# Patient Record
Sex: MALE | Race: WHITE | NOT HISPANIC OR LATINO
[De-identification: names, ages, dates, MRNs, and addresses within clinical notes are randomized per-mention and may not be internally consistent; named-entity substitution may affect disease eponyms.]

---

## 2019-07-31 PROBLEM — Z00.00 ENCOUNTER FOR PREVENTIVE HEALTH EXAMINATION: Status: ACTIVE | Noted: 2019-07-31

## 2019-08-05 ENCOUNTER — APPOINTMENT (OUTPATIENT)
Dept: PULMONOLOGY | Facility: CLINIC | Age: 64
End: 2019-08-05
Payer: COMMERCIAL

## 2019-08-05 ENCOUNTER — APPOINTMENT (OUTPATIENT)
Dept: NEPHROLOGY | Facility: CLINIC | Age: 64
End: 2019-08-05
Payer: COMMERCIAL

## 2019-08-05 VITALS
SYSTOLIC BLOOD PRESSURE: 140 MMHG | HEART RATE: 76 BPM | BODY MASS INDEX: 34.93 KG/M2 | DIASTOLIC BLOOD PRESSURE: 78 MMHG | OXYGEN SATURATION: 94 % | HEIGHT: 70 IN | WEIGHT: 244 LBS

## 2019-08-05 VITALS
HEIGHT: 70 IN | HEART RATE: 64 BPM | DIASTOLIC BLOOD PRESSURE: 72 MMHG | SYSTOLIC BLOOD PRESSURE: 164 MMHG | BODY MASS INDEX: 34.93 KG/M2 | WEIGHT: 244 LBS

## 2019-08-05 LAB — EPWORTH SCORE: 8

## 2019-08-05 PROCEDURE — 99214 OFFICE O/P EST MOD 30 MIN: CPT

## 2019-08-05 PROCEDURE — 99204 OFFICE O/P NEW MOD 45 MIN: CPT

## 2019-08-05 NOTE — REVIEW OF SYSTEMS
[Fever] : no fever [Chills] : no chills [Fatigue] : no fatigue [Poor Appetite] : normal appetite  [Recent Wt Gain (___ Lbs)] : recent [unfilled] ~Ulb weight gain [Dry Eyes] : no dryness of the eyes [Eye Irritation] : no ~T irritation of the eyes [Nasal Congestion] : no nasal congestion [Cough] : no cough [Sputum] : not coughing up ~M sputum [Hemoptysis] : no hemoptysis [Dyspnea] : no dyspnea [Hypertension] : ~T hypertension [Chest Discomfort] : no chest discomfort [Orthopnea] : no orthopnea [Edema] : ~T edema was not present [Hay Fever] : no hay fever [Nasal Discharge] : no nasal discharge [Itchy Eyes] : no itching of ~T the eyes [Heartburn] : no heartburn [Reflux] : no reflux [Nausea] : no nausea [Vomiting] : no vomiting [Nocturia] : no nocturia [Arthralgias] : no arthralgias [Headache] : no headache [Rash] : no [unfilled] rash [Dizziness] : no dizziness

## 2019-08-05 NOTE — PHYSICAL EXAM
[General Appearance - Well Developed] : well developed [General Appearance - In No Acute Distress] : no acute distress [General Appearance - Well Nourished] : well nourished [III] : III [Thyroid Diffuse Enlargement] : the thyroid was not enlarged [Jugular Venous Distention Increased] : there was no jugular-venous distention [Arterial Pulses Normal] : the arterial pulses were normal [Heart Sounds] : normal S1 and S2 [Exaggerated Use Of Accessory Muscles For Inspiration] : no accessory muscle use [Respiration, Rhythm And Depth] : normal respiratory rhythm and effort [Abdomen Soft] : soft [Auscultation Breath Sounds / Voice Sounds] : lungs were clear to auscultation bilaterally [Bowel Sounds] : normal bowel sounds [Nail Clubbing] : no clubbing of the fingernails [] : no hepato-splenomegaly [Cyanosis, Localized] : no localized cyanosis [Fingers Osler's Nodes] : Osler's nodes were not seenon the fingers [Skin Color & Pigmentation] : normal skin color and pigmentation [No Focal Deficits] : no focal deficits [Oriented To Time, Place, And Person] : oriented to person, place, and time [Impaired Insight] : insight and judgment were intact

## 2019-08-05 NOTE — ASSESSMENT
[FreeTextEntry1] : JAKUB\par Obesity\par \par This is a 64 year old  with a medical history including HTN presenting with  JAKUB. Today we reviewed the pathophysiology of this condition including the increased risk of neurocognitive, metabolic and cardiovascular complications. I explained that dilating muscles that normally maintain the airway open during wakefulness are less active in sleep and that the diameter of the upper airway normally decreases in sleep. I demonstrated how an additional factor such as overweight or a disadvantageous airway anatomy may cause the airway to collapse. Furthermore I explained that airway compromise is typically followed by an arousal which the patient may or may not be aware of

## 2019-08-05 NOTE — HISTORY OF PRESENT ILLNESS
[Obstructive Sleep Apnea] : obstructive sleep apnea [FreeTextEntry1] : 64 year old male with JAKUB, diagnosed last year, currently on PAP therapy, for evaluation.\par Denies other lung problems\par Thinks he sleeps better with PAP.\par Does not know if he snores.\par Recently d/c from the hospital.\par \par BT 10 pm\par SLT 15 min\par WT 6 am

## 2019-08-06 ENCOUNTER — RECORD ABSTRACTING (OUTPATIENT)
Age: 64
End: 2019-08-06

## 2019-08-06 DIAGNOSIS — Z87.898 PERSONAL HISTORY OF OTHER SPECIFIED CONDITIONS: ICD-10-CM

## 2019-08-06 DIAGNOSIS — Z87.19 PERSONAL HISTORY OF OTHER DISEASES OF THE DIGESTIVE SYSTEM: ICD-10-CM

## 2019-08-06 DIAGNOSIS — R60.0 LOCALIZED EDEMA: ICD-10-CM

## 2019-08-07 LAB
ALDOSTERONE SERUM: 41.5 NG/DL
ANION GAP SERPL CALC-SCNC: 19 MMOL/L
BUN SERPL-MCNC: 19 MG/DL
CALCIUM SERPL-MCNC: 10.7 MG/DL
CHLORIDE SERPL-SCNC: 99 MMOL/L
CO2 SERPL-SCNC: 24 MMOL/L
CREAT SERPL-MCNC: 1.17 MG/DL
GLUCOSE SERPL-MCNC: 103 MG/DL
POTASSIUM SERPL-SCNC: 5.3 MMOL/L
SODIUM SERPL-SCNC: 142 MMOL/L

## 2019-08-07 NOTE — HISTORY OF PRESENT ILLNESS
[FreeTextEntry1] : f/u for hospitalization following admission for hypertensive urgency -  possible hyperaldo, BP elevated but improving; using CPAP, tolerating celexa - dose increased to 20mg, counseled on weightlos, exercise, avoiadnce of NSAIDs, recheck bmet, renin/bartolo

## 2019-08-07 NOTE — PHYSICAL EXAM
[General Appearance - In No Acute Distress] : in no acute distress [General Appearance - Alert] : alert [Sclera] : the sclera and conjunctiva were normal [Extraocular Movements] : extraocular movements were intact [Outer Ear] : the ears and nose were normal in appearance [Hearing Threshold Finger Rub Not [place]] : hearing was normal [Neck Appearance] : the appearance of the neck was normal [Apical Impulse] : the apical impulse was normal [Exaggerated Use Of Accessory Muscles For Inspiration] : no accessory muscle use [Heart Sounds] : normal S1 and S2 [No CVA Tenderness] : no ~M costovertebral angle tenderness [Abnormal Walk] : normal gait [Musculoskeletal - Swelling] : no joint swelling seen [Skin Turgor] : normal skin turgor [Skin Color & Pigmentation] : normal skin color and pigmentation [] : no rash [Impaired Insight] : insight and judgment were intact [Oriented To Time, Place, And Person] : oriented to person, place, and time

## 2019-08-20 ENCOUNTER — RX RENEWAL (OUTPATIENT)
Age: 64
End: 2019-08-20

## 2019-08-20 LAB — RENIN ACTIVITY, PLASMA: 1.85 NG/ML/HR

## 2019-08-26 ENCOUNTER — APPOINTMENT (OUTPATIENT)
Dept: NEPHROLOGY | Facility: CLINIC | Age: 64
End: 2019-08-26
Payer: COMMERCIAL

## 2019-08-26 VITALS — HEART RATE: 60 BPM | SYSTOLIC BLOOD PRESSURE: 148 MMHG | DIASTOLIC BLOOD PRESSURE: 66 MMHG

## 2019-08-26 DIAGNOSIS — F41.9 ANXIETY DISORDER, UNSPECIFIED: ICD-10-CM

## 2019-08-26 PROCEDURE — 93784 AMBL BP MNTR W/SOFTWARE: CPT

## 2019-08-26 PROCEDURE — 36415 COLL VENOUS BLD VENIPUNCTURE: CPT

## 2019-08-27 LAB
ANION GAP SERPL CALC-SCNC: 14 MMOL/L
BUN SERPL-MCNC: 15 MG/DL
CALCIUM SERPL-MCNC: 10.2 MG/DL
CHLORIDE SERPL-SCNC: 99 MMOL/L
CO2 SERPL-SCNC: 26 MMOL/L
CREAT SERPL-MCNC: 1.26 MG/DL
GLUCOSE SERPL-MCNC: 137 MG/DL
POTASSIUM SERPL-SCNC: 4.9 MMOL/L
SODIUM SERPL-SCNC: 139 MMOL/L

## 2019-08-29 ENCOUNTER — OTHER (OUTPATIENT)
Age: 64
End: 2019-08-29

## 2019-09-18 ENCOUNTER — RESULT REVIEW (OUTPATIENT)
Age: 64
End: 2019-09-18

## 2019-09-18 ENCOUNTER — APPOINTMENT (OUTPATIENT)
Dept: NEPHROLOGY | Facility: CLINIC | Age: 64
End: 2019-09-18
Payer: COMMERCIAL

## 2019-09-18 ENCOUNTER — APPOINTMENT (OUTPATIENT)
Dept: PULMONOLOGY | Facility: CLINIC | Age: 64
End: 2019-09-18
Payer: COMMERCIAL

## 2019-09-18 VITALS
SYSTOLIC BLOOD PRESSURE: 140 MMHG | HEART RATE: 71 BPM | HEIGHT: 70 IN | BODY MASS INDEX: 35 KG/M2 | WEIGHT: 244.5 LBS | DIASTOLIC BLOOD PRESSURE: 88 MMHG

## 2019-09-18 VITALS
SYSTOLIC BLOOD PRESSURE: 140 MMHG | OXYGEN SATURATION: 98 % | HEART RATE: 71 BPM | BODY MASS INDEX: 34.93 KG/M2 | DIASTOLIC BLOOD PRESSURE: 88 MMHG | WEIGHT: 244 LBS | HEIGHT: 70 IN

## 2019-09-18 DIAGNOSIS — Z87.39 PERSONAL HISTORY OF OTHER DISEASES OF THE MUSCULOSKELETAL SYSTEM AND CONNECTIVE TISSUE: ICD-10-CM

## 2019-09-18 PROCEDURE — 99213 OFFICE O/P EST LOW 20 MIN: CPT

## 2019-09-18 PROCEDURE — 99214 OFFICE O/P EST MOD 30 MIN: CPT

## 2019-09-18 RX ORDER — SPIRONOLACTONE 50 MG/1
50 TABLET ORAL TWICE DAILY
Qty: 180 | Refills: 1 | Status: DISCONTINUED | COMMUNITY
Start: 2019-08-20 | End: 2019-09-18

## 2019-09-18 RX ORDER — CITALOPRAM HYDROBROMIDE 10 MG/1
10 TABLET, FILM COATED ORAL DAILY
Qty: 90 | Refills: 0 | Status: DISCONTINUED | COMMUNITY
Start: 1900-01-01 | End: 2019-09-18

## 2019-09-18 NOTE — PHYSICAL EXAM
[General Appearance - Alert] : alert [General Appearance - In No Acute Distress] : in no acute distress [Sclera] : the sclera and conjunctiva were normal [Extraocular Movements] : extraocular movements were intact [Outer Ear] : the ears and nose were normal in appearance [Neck Appearance] : the appearance of the neck was normal [Hearing Threshold Finger Rub Not [place]] : hearing was normal [Exaggerated Use Of Accessory Muscles For Inspiration] : no accessory muscle use [Apical Impulse] : the apical impulse was normal [No CVA Tenderness] : no ~M costovertebral angle tenderness [Heart Sounds] : normal S1 and S2 [Musculoskeletal - Swelling] : no joint swelling seen [Abnormal Walk] : normal gait [Skin Turgor] : normal skin turgor [Skin Color & Pigmentation] : normal skin color and pigmentation [Oriented To Time, Place, And Person] : oriented to person, place, and time [] : no rash [Impaired Insight] : insight and judgment were intact

## 2019-09-19 NOTE — ASSESSMENT
[FreeTextEntry1] : f/u for hospitalization following admission for hypertensive urgency -  possible hyperaldo, BP elevated but improving; using CPAP, diff losing weight, given loss of body hair and chushingoid habitus will check testosterone, prolactin and cortisol level. Though he has not developed gynecomastia, yadiel change spironolactone to eplerenone, reduce labetolol  - advised he monitor his bp. As this visit was mid afternoon, cortisol level was not checked. Will have him return  for early am cortisol level, acth. May need referral to Endocrine.

## 2019-09-19 NOTE — HISTORY OF PRESENT ILLNESS
[FreeTextEntry1] : here for f/u for hospitalization following admission for hypertensive urgency -  possible hyperaldo, BP elevated but improving; using CPAP, counseled on weightloss, exercise, avoiadnce of NSAIDs, is continuing to have diff losing weight - acknowledgeshe has lost body hair ( arms, face, chest), denies breast tenderness

## 2019-09-24 ENCOUNTER — MEDICATION RENEWAL (OUTPATIENT)
Age: 64
End: 2019-09-24

## 2019-09-24 ENCOUNTER — APPOINTMENT (OUTPATIENT)
Dept: NEPHROLOGY | Facility: CLINIC | Age: 64
End: 2019-09-24
Payer: COMMERCIAL

## 2019-09-24 VITALS — HEART RATE: 60 BPM | SYSTOLIC BLOOD PRESSURE: 132 MMHG | DIASTOLIC BLOOD PRESSURE: 62 MMHG

## 2019-09-24 PROCEDURE — 36415 COLL VENOUS BLD VENIPUNCTURE: CPT

## 2019-09-24 PROCEDURE — 93784 AMBL BP MNTR W/SOFTWARE: CPT

## 2019-09-24 RX ORDER — EPLERENONE 50 MG/1
50 TABLET, COATED ORAL DAILY
Qty: 90 | Refills: 1 | Status: DISCONTINUED | COMMUNITY
Start: 2019-09-18 | End: 2019-09-24

## 2019-09-25 LAB
ALDOSTERONE SERUM: 67 NG/DL
ANION GAP SERPL CALC-SCNC: 13 MMOL/L
BUN SERPL-MCNC: 21 MG/DL
CALCIUM SERPL-MCNC: 10 MG/DL
CHLORIDE SERPL-SCNC: 104 MMOL/L
CO2 SERPL-SCNC: 24 MMOL/L
CORTIS SERPL-MCNC: 9.6 UG/DL
CREAT SERPL-MCNC: 1.38 MG/DL
GLUCOSE SERPL-MCNC: 135 MG/DL
POTASSIUM SERPL-SCNC: 4.9 MMOL/L
SODIUM SERPL-SCNC: 141 MMOL/L

## 2019-10-01 ENCOUNTER — APPOINTMENT (OUTPATIENT)
Dept: NEPHROLOGY | Facility: CLINIC | Age: 64
End: 2019-10-01
Payer: COMMERCIAL

## 2019-10-01 VITALS — SYSTOLIC BLOOD PRESSURE: 176 MMHG | HEART RATE: 80 BPM | DIASTOLIC BLOOD PRESSURE: 84 MMHG

## 2019-10-01 LAB — RENIN ACTIVITY, PLASMA: 0.75 NG/ML/HR

## 2019-10-01 PROCEDURE — 93784 AMBL BP MNTR W/SOFTWARE: CPT

## 2019-10-03 ENCOUNTER — MEDICATION RENEWAL (OUTPATIENT)
Age: 64
End: 2019-10-03

## 2019-10-08 ENCOUNTER — RESULT REVIEW (OUTPATIENT)
Age: 64
End: 2019-10-08

## 2019-10-17 ENCOUNTER — RESULT REVIEW (OUTPATIENT)
Age: 64
End: 2019-10-17

## 2019-10-30 ENCOUNTER — RESULT REVIEW (OUTPATIENT)
Age: 64
End: 2019-10-30

## 2019-10-30 ENCOUNTER — APPOINTMENT (OUTPATIENT)
Dept: NEPHROLOGY | Facility: CLINIC | Age: 64
End: 2019-10-30
Payer: COMMERCIAL

## 2019-10-30 VITALS
SYSTOLIC BLOOD PRESSURE: 148 MMHG | WEIGHT: 249 LBS | DIASTOLIC BLOOD PRESSURE: 70 MMHG | HEIGHT: 70 IN | BODY MASS INDEX: 35.65 KG/M2 | HEART RATE: 60 BPM

## 2019-10-30 PROCEDURE — 99214 OFFICE O/P EST MOD 30 MIN: CPT | Mod: 25

## 2019-10-30 PROCEDURE — 36415 COLL VENOUS BLD VENIPUNCTURE: CPT

## 2019-10-30 RX ORDER — NIFEDIPINE 30 MG/1
30 TABLET, EXTENDED RELEASE ORAL DAILY
Qty: 90 | Refills: 0 | Status: DISCONTINUED | COMMUNITY
Start: 1900-01-01 | End: 2019-10-30

## 2019-10-30 NOTE — PHYSICAL EXAM
[General Appearance - Alert] : alert [General Appearance - In No Acute Distress] : in no acute distress [Sclera] : the sclera and conjunctiva were normal [Extraocular Movements] : extraocular movements were intact [Outer Ear] : the ears and nose were normal in appearance [Hearing Threshold Finger Rub Not [place]] : hearing was normal [Neck Appearance] : the appearance of the neck was normal [Exaggerated Use Of Accessory Muscles For Inspiration] : no accessory muscle use [Apical Impulse] : the apical impulse was normal [Heart Sounds] : normal S1 and S2 [No CVA Tenderness] : no ~M costovertebral angle tenderness [Abnormal Walk] : normal gait [Musculoskeletal - Swelling] : no joint swelling seen [Skin Color & Pigmentation] : normal skin color and pigmentation [Skin Turgor] : normal skin turgor [] : no rash [Oriented To Time, Place, And Person] : oriented to person, place, and time [Impaired Insight] : insight and judgment were intact

## 2019-10-30 NOTE — ASSESSMENT
[FreeTextEntry1] : 63 yo Polish male here for f/u for hypertensive urgency -  possible hyperaldo, BP elevated but improving; using CPAP, counseled on weight loss, exercise, avoidance of NSAIDs, is continuing to have diff losing weight - tried to taper down dose of methylodpa and replace with nifedipine while continuing labetalol and eplerenone,states BP elevated early am prior to taking meds and late pm before taking meds - suspect 2/2 waning effect - will d/c nifedipine and resume methyldopa to 500mg bid, cont labetalol and eplerenone.\par \par CTA  showed no AAA or KAN; MRI of adrenals -ve for adenoma, \par \par Continues to gain weight - checked testosterone levels and total was low normal, free was below normal -suspect this may be source of weight gain , inability to lose weight and adding to difficulty controlling bp

## 2019-10-30 NOTE — HISTORY OF PRESENT ILLNESS
[FreeTextEntry1] : 65 yo [place] male here for f/u for hypertensive urgency -  possible hyperaldo, BP elevated but improving; using CPAP, counseled on weightloss, exercise, avoidance of NSAIDs, is continuing to have diff losing weight - tried to taper down dose of methylodpa and replace with nifedipine while continuing labetalol and eplerenone,states BP elevated early am prior to taking meds and late pm before taking meds - suspect 2/2 waning effect - will d/c nifedipine and resume methyldopa t 500mg bid, cont labetalol and eplerenone.\par \par CTA  showed no AAA or KAN; MRI of adrenals -ve for adenoma, \par \par Continues to gain weight - checked testosterone levels and total was low normal, free was below normal -suspect this may be source of weight gain , inability to lose weight and adding to difficulty controlling bp

## 2019-11-06 ENCOUNTER — APPOINTMENT (OUTPATIENT)
Dept: ENDOCRINOLOGY | Facility: CLINIC | Age: 64
End: 2019-11-06
Payer: COMMERCIAL

## 2019-11-06 VITALS
SYSTOLIC BLOOD PRESSURE: 142 MMHG | HEART RATE: 76 BPM | WEIGHT: 250 LBS | BODY MASS INDEX: 35.79 KG/M2 | DIASTOLIC BLOOD PRESSURE: 78 MMHG | HEIGHT: 70 IN

## 2019-11-06 DIAGNOSIS — E53.8 DEFICIENCY OF OTHER SPECIFIED B GROUP VITAMINS: ICD-10-CM

## 2019-11-06 DIAGNOSIS — D64.9 ANEMIA, UNSPECIFIED: ICD-10-CM

## 2019-11-06 PROCEDURE — 99203 OFFICE O/P NEW LOW 30 MIN: CPT

## 2019-11-06 NOTE — HISTORY OF PRESENT ILLNESS
[FreeTextEntry1] : Nov 06, 2019\par \par PCP:  Dr. Horace Barrera at [facility]  fax [phone]\par          Kidney:  Dr. Ziggy Elizabeth\par          Lung:  Dr. Toribio Delacruz\par \par 63 yo single, , ex-smoker with fatigue, hypertension, decreased GFR, prediabetes\par \par Brings labs kindly provided by Dr. Barrera from\par 10/3/2019 including\par creatinine 1.49\par glucose 117 \par HbA1c 6.1 %\par  (down from 500) \par TSH 2.4 \par \par Patient reports he was hospitalized for hyeprtension end of July.  \par Labs at [facility] from October 30, 2019 included\par  \par aldosterone 39 (< 23)\par renin 6.1\par testosterone 275 (264 - 916)\par cortisol 9.6\par \par September 2019\par Hct 39.5 ***\par creatinine 1.6\par prolactin 7.5\par testosterone 296 (264 - 916)\par calcium 10.7 ***\par \par MRI abdomen - normal adrenals\par \par Impression:  \par # Fatigue.  His evaluation included total testosterone within population normal limits on two occasions, as\par well as normal prolactin.   Even so, LH will be helpful in determining possible early primary hypogonadism.\par Labs also show that he appears to have developed a mild anemia.   [facility] records show\par essentially normal hematocrit going back to 2010.   He recalls a colonoscopy at office of Dr. Mondragon about\par 5 years ago. He will go for repeat CBC and also discuss with Dr. Barrera\par \par #  Hypertension.   Found to have normal renin, elevated aldosterone raising possibility of\par mineralocorticoid hypertension or possibly KAN.   Thorough evaluation includeing CT and MRI of\par abdomen revealed normal appearing adrenal glands.   BP has come under control with current Rx.  \par \par #  Prediabetes -  He will stop by [facility] for updated fasting labs within the next few days and I took the\par     liberty of asking him to return here in several weeks.   Thank you very much.  - \par \par \par

## 2019-11-06 NOTE — PHYSICAL EXAM
[Alert] : alert [No Acute Distress] : no acute distress [Well Nourished] : well nourished [Well Developed] : well developed [Healthy Appearance] : healthy appearance [Normal Voice/Communication] : normal voice communication [PERRL] : pupils equal, round and reactive to light [No Proptosis] : no proptosis [No Lid Lag] : no lid lag [No Respiratory Distress] : no respiratory distress [Normal Rate and Effort] : normal respiratory rhythm and effort [No Accessory Muscle Use] : no accessory muscle use [Normal Rate] : heart rate was normal  [Normal S1, S2] : normal S1 and S2 [Regular Rhythm] : with a regular rhythm [No Edema] : there was no peripheral edema [No Stigmata of Cushings Syndrome] : no stigmata of cushings syndrome [No Tremors] : no tremors [Oriented x3] : oriented to person, place, and time [Normal Insight/Judgement] : insight and judgment were intact [Normal Affect] : the affect was normal [Normal Mood] : the mood was normal [de-identified] : Thyroid a tad plump

## 2019-11-07 ENCOUNTER — INBOUND DOCUMENT (OUTPATIENT)
Age: 64
End: 2019-11-07

## 2019-11-08 LAB
ACTH SER-ACNC: 18.6 PG/ML
APPEARANCE: CLEAR
BACTERIA: NEGATIVE
BASOPHILS # BLD AUTO: 0.07 K/UL
BASOPHILS NFR BLD AUTO: 0.7 %
BILIRUBIN URINE: NEGATIVE
BLOOD URINE: NEGATIVE
COLOR: YELLOW
CORTIS SERPL-MCNC: 9.6 UG/DL
CREAT SPEC-SCNC: 147 MG/DL
EOSINOPHIL # BLD AUTO: 0.16 K/UL
EOSINOPHIL NFR BLD AUTO: 1.6 %
ESTRADIOL SERPL-MCNC: 29 PG/ML
FERRITIN SERPL-MCNC: 265 NG/ML
FRUCTOSAMINE SERPL-MCNC: 244 UMOL/L
FSH SERPL-MCNC: 3.8 IU/L
GLUCOSE QUALITATIVE U: NEGATIVE
HCT VFR BLD CALC: 42.9 %
HGB BLD-MCNC: 13.9 G/DL
HYALINE CASTS: 0 /LPF
IMM GRANULOCYTES NFR BLD AUTO: 0.3 %
IRON SATN MFR SERPL: 19 %
IRON SERPL-MCNC: 64 UG/DL
KETONES URINE: NEGATIVE
LEUKOCYTE ESTERASE URINE: NEGATIVE
LH SERPL-ACNC: 3.3 IU/L
LYMPHOCYTES # BLD AUTO: 1.65 K/UL
LYMPHOCYTES NFR BLD AUTO: 16.5 %
MAN DIFF?: NORMAL
MCHC RBC-ENTMCNC: 32 PG
MCHC RBC-ENTMCNC: 32.4 GM/DL
MCV RBC AUTO: 98.8 FL
MICROALBUMIN 24H UR DL<=1MG/L-MCNC: 8.2 MG/DL
MICROALBUMIN/CREAT 24H UR-RTO: 56 MG/G
MICROSCOPIC-UA: NORMAL
MONOCYTES # BLD AUTO: 0.85 K/UL
MONOCYTES NFR BLD AUTO: 8.5 %
NEUTROPHILS # BLD AUTO: 7.27 K/UL
NEUTROPHILS NFR BLD AUTO: 72.4 %
NITRITE URINE: NEGATIVE
PH URINE: 7
PHOSPHATE SERPL-MCNC: 3.3 MG/DL
PLATELET # BLD AUTO: 242 K/UL
PROTEIN URINE: NORMAL
RBC # BLD: 4.34 M/UL
RBC # BLD: 4.34 M/UL
RBC # FLD: 12.5 %
RED BLOOD CELLS URINE: 1 /HPF
RETICS # AUTO: 1.9 %
RETICS AGGREG/RBC NFR: 80.7 K/UL
SPECIFIC GRAVITY URINE: 1.02
SQUAMOUS EPITHELIAL CELLS: 0 /HPF
T3 SERPL-MCNC: 125 NG/DL
T4 SERPL-MCNC: 6.9 UG/DL
THYROGLOB AB SERPL-ACNC: <20 IU/ML
THYROPEROXIDASE AB SERPL IA-ACNC: 24 IU/ML
TIBC SERPL-MCNC: 334 UG/DL
TSH SERPL-ACNC: 1.93 UIU/ML
UIBC SERPL-MCNC: 270 UG/DL
UROBILINOGEN URINE: NORMAL
VIT B12 SERPL-MCNC: 391 PG/ML
WBC # FLD AUTO: 10.03 K/UL
WHITE BLOOD CELLS URINE: 0 /HPF

## 2019-11-14 ENCOUNTER — APPOINTMENT (OUTPATIENT)
Dept: NEPHROLOGY | Facility: CLINIC | Age: 64
End: 2019-11-14
Payer: COMMERCIAL

## 2019-11-14 VITALS
BODY MASS INDEX: 35.07 KG/M2 | HEART RATE: 76 BPM | DIASTOLIC BLOOD PRESSURE: 60 MMHG | WEIGHT: 245 LBS | HEIGHT: 70 IN | SYSTOLIC BLOOD PRESSURE: 130 MMHG

## 2019-11-14 PROCEDURE — 99215 OFFICE O/P EST HI 40 MIN: CPT

## 2019-11-14 NOTE — PHYSICAL EXAM
[General Appearance - Alert] : alert [General Appearance - In No Acute Distress] : in no acute distress [Extraocular Movements] : extraocular movements were intact [Sclera] : the sclera and conjunctiva were normal [Outer Ear] : the ears and nose were normal in appearance [Hearing Threshold Finger Rub Not [place]] : hearing was normal [Neck Appearance] : the appearance of the neck was normal [Exaggerated Use Of Accessory Muscles For Inspiration] : no accessory muscle use [Apical Impulse] : the apical impulse was normal [Heart Sounds] : normal S1 and S2 [No CVA Tenderness] : no ~M costovertebral angle tenderness [Abnormal Walk] : normal gait [Musculoskeletal - Swelling] : no joint swelling seen [Skin Color & Pigmentation] : normal skin color and pigmentation [Skin Turgor] : normal skin turgor [] : no rash [Impaired Insight] : insight and judgment were intact [Oriented To Time, Place, And Person] : oriented to person, place, and time

## 2019-11-15 ENCOUNTER — APPOINTMENT (OUTPATIENT)
Dept: ENDOCRINOLOGY | Facility: CLINIC | Age: 64
End: 2019-11-15
Payer: COMMERCIAL

## 2019-11-15 VITALS
WEIGHT: 245 LBS | BODY MASS INDEX: 35.07 KG/M2 | HEIGHT: 70 IN | SYSTOLIC BLOOD PRESSURE: 135 MMHG | DIASTOLIC BLOOD PRESSURE: 78 MMHG | HEART RATE: 68 BPM

## 2019-11-15 DIAGNOSIS — Z86.39 PERSONAL HISTORY OF OTHER ENDOCRINE, NUTRITIONAL AND METABOLIC DISEASE: ICD-10-CM

## 2019-11-15 DIAGNOSIS — R73.03 PREDIABETES.: ICD-10-CM

## 2019-11-15 PROCEDURE — 99214 OFFICE O/P EST MOD 30 MIN: CPT

## 2019-11-15 NOTE — ASSESSMENT
[FreeTextEntry1] : 63 yo [place] male here for f/u for hypertensive urgency -  possible hyperaldo, BP elevated but improving; using CPAP, counseled on weight loss, exercise, avoidance of NSAIDs, is continuing to have diff losing weight - tried to taper down dose of methylodpa and replace with nifedipine while continuing labetalol and eplerenone,states BP elevated early am prior to taking meds and late pm before taking meds - suspect 2/2 waning effect - will d/c nifedipine and resume methyldopa to 500mg bid, cont labetalol and eplerenone.\par \par CTA  showed no AAA or KAN; MRI of adrenals -ve for adenoma, \par \par Continues to gain weight - checked testosterone levels and total was low normal, free was below normal -suspect this may be source of weight gain , inability to lose weight and adding to difficulty controlling bp, case d/w Dr. Hellerman, agrees to trial of testosterone repalcement/androgel - will see in am

## 2019-11-17 LAB
CORTIS SERPL-MCNC: 12.3 UG/DL
DHEA-SULFATE, SERUM: 36 UG/DL
PSA SERPL-MCNC: 1.25 NG/ML

## 2019-11-19 ENCOUNTER — RX RENEWAL (OUTPATIENT)
Age: 64
End: 2019-11-19

## 2019-11-19 LAB — CGA SERPL-MCNC: 56 NG/ML

## 2019-11-20 LAB
17OHP SERPL-MCNC: 40 NG/DL
DHEA-SULFATE, SERUM: 56 UG/DL
TESTOST BND SERPL-MCNC: 2.6 PG/ML
TESTOST SERPL-MCNC: 153.4 NG/DL

## 2019-11-26 ENCOUNTER — APPOINTMENT (OUTPATIENT)
Dept: NEPHROLOGY | Facility: CLINIC | Age: 64
End: 2019-11-26
Payer: COMMERCIAL

## 2019-11-26 VITALS — SYSTOLIC BLOOD PRESSURE: 140 MMHG | DIASTOLIC BLOOD PRESSURE: 72 MMHG

## 2019-11-26 VITALS
SYSTOLIC BLOOD PRESSURE: 148 MMHG | WEIGHT: 243 LBS | HEART RATE: 72 BPM | HEIGHT: 70 IN | DIASTOLIC BLOOD PRESSURE: 70 MMHG | BODY MASS INDEX: 34.79 KG/M2

## 2019-11-26 DIAGNOSIS — E29.1 TESTICULAR HYPOFUNCTION: ICD-10-CM

## 2019-11-26 PROCEDURE — 99214 OFFICE O/P EST MOD 30 MIN: CPT

## 2019-11-26 PROCEDURE — 93784 AMBL BP MNTR W/SOFTWARE: CPT

## 2019-11-26 RX ORDER — LABETALOL HYDROCHLORIDE 200 MG/1
200 TABLET, FILM COATED ORAL
Qty: 180 | Refills: 2 | Status: DISCONTINUED | COMMUNITY
Start: 2019-11-19 | End: 2019-11-26

## 2019-11-26 RX ORDER — LABETALOL HYDROCHLORIDE 100 MG/1
100 TABLET, FILM COATED ORAL
Qty: 360 | Refills: 1 | Status: DISCONTINUED | COMMUNITY
End: 2019-11-26

## 2019-11-26 NOTE — ASSESSMENT
[FreeTextEntry1] : 63 yo [place] male here for f/u for hypertensive urgency -  possible hyperaldo, BP elevated but improving; using CPAP, counseled on weightloss, exercise, avoidance of NSAIDs, is continuing to have diff losing weight - tried to taper down dose of methylodpa and replace with nifedipine while continuing labetalol and eplerenone,states BP elevated early am prior to taking meds and late pm before taking meds - suspect 2/2 waning effect - last visit 11/14/19 I d/c'd nifedipine and resumed methyldopa t 500mg bid, changed labetalol to carvedilol and he remianed on eplerenone.\par \par BP readings here all wnl, but pt reports at home in eveing and in am they are high - suspect effect of meds is wearng off, may need higher dose or tid - will increase carvedilol to 12.5mg bid,cont other meds\par \par \par Continues to gain weight - checked testosterone levels and total was low normal, free was below normal -suspect this may be source of weight gain , inability to lose weight and adding to difficulty controlling bp, case previously d/w Dr. Hellerman, agrees to trial of testosterone replacement/androgel - will see in am

## 2019-11-26 NOTE — HISTORY OF PRESENT ILLNESS
[FreeTextEntry1] : 63 yo [place] male here for f/u for hypertensive urgency -  possible hyperaldo, BP elevated but improving; using CPAP, counseled on weightloss, exercise, avoidance of NSAIDs, is continuing to have diff losing weight - tried to taper down dose of methylodpa and replace with nifedipine while continuing labetalol and eplerenone,states BP elevated early am prior to taking meds and late pm before taking meds - suspect 2/2 waning effect - last visit 11/14/19 I d/c'd nifedipine and resumed methyldopa t 500mg bid, changed labetalol to carvedilol and he remianed on eplerenone.\par \par BP readings here all wnl, but pt reports at home in eveing and in am they are high - suspect effect of meds is wearng off, may need higher dose or tid - \par \par CTA  showed no AAA or KAN; MRI of adrenals -ve for adenoma, \par \par

## 2019-11-26 NOTE — PHYSICAL EXAM
[General Appearance - Alert] : alert [Extraocular Movements] : extraocular movements were intact [General Appearance - In No Acute Distress] : in no acute distress [Sclera] : the sclera and conjunctiva were normal [Neck Appearance] : the appearance of the neck was normal [Hearing Threshold Finger Rub Not [place]] : hearing was normal [Outer Ear] : the ears and nose were normal in appearance [Heart Sounds] : normal S1 and S2 [Apical Impulse] : the apical impulse was normal [Exaggerated Use Of Accessory Muscles For Inspiration] : no accessory muscle use [No CVA Tenderness] : no ~M costovertebral angle tenderness [Abnormal Walk] : normal gait [Musculoskeletal - Swelling] : no joint swelling seen [Skin Color & Pigmentation] : normal skin color and pigmentation [Skin Turgor] : normal skin turgor [Impaired Insight] : insight and judgment were intact [Oriented To Time, Place, And Person] : oriented to person, place, and time [] : no rash

## 2019-11-30 PROBLEM — R73.03 PREDIABETES: Status: ACTIVE | Noted: 2019-11-06

## 2019-11-30 PROBLEM — Z86.39 HISTORY OF HYPOTHYROIDISM: Status: RESOLVED | Noted: 2019-11-06 | Resolved: 2019-11-30

## 2019-11-30 NOTE — HISTORY OF PRESENT ILLNESS
[FreeTextEntry1] : Nov 15, 2019                   Reference #: [id number] I-Stop\par \par PCP:  Dr. Horace Barrera at [facility]  fax [phone]\par          Kidney:  Dr. Ziggy Elizabeth\par          Lung:  Dr. Toribio Delacruz\par \par CC:  Fatigue\par         Prediabetes (10/3/19 A1c 6.1 %)\par         Decreased GFR (10/3/19  creatinine 1.49)\par         Hypercholesterolemia (LDL as high as 500)\par         ?hypercalcemia  - (9/2019  Ca++ 10.7)\par         ?transient anemia (9/2019  Hct 39.5)\par          low free Te.   9/24/19 total Te 296 (> 264);   10/30/10  total Te 275 (>264)\par \par 63 yo single, , ex-smoker with fatigue, hypertension, decreased GFR, prediabetes, low free testosterone.\par Impression:  Multiple issues conspire to contribute to his symptoms.  \par Will address each one at a time.\par \par \par \par \par \par \par Nov 06, 2019\par \par PCP:  Dr. Horace Barrera at [facility]  fax [phone]\par          Kidney:  Dr. Ziggy Elizabeth\par          Lung:  Dr. Toribio Delacruz\par \par 63 yo single, , ex-smoker with fatigue, hypertension, decreased GFR, prediabetes\par \par Brings labs kindly provided by Dr. Barrera from\par 10/3/2019 including\par creatinine 1.49\par glucose 117 \par HbA1c 6.1 %\par  (down from 500) \par TSH 2.4 \par \par Patient reports he was hospitalized for hyeprtension end of July.  \par Labs at [facility] from October 30, 2019 included\par  \par aldosterone 39 (< 23)\par renin 6.1\par testosterone 275 (264 - 916)\par cortisol 9.6\par \par September 2019\par Hct 39.5 ***\par creatinine 1.6\par prolactin 7.5\par testosterone 296 (264 - 916)\par calcium 10.7 ***\par \par MRI abdomen - normal adrenals\par \par Impression:  \par # Fatigue.  His evaluation included total testosterone within population normal limits on two occasions, as\par well as normal prolactin.   Even so, LH will be helpful in determining possible early primary hypogonadism.\par Labs also show that he appears to have developed a mild anemia.   [facility] records show\par essentially normal hematocrit going back to 2010.   He recalls a colonoscopy at office of Dr. Mondragon about\par 5 years ago. He will go for repeat CBC and also discuss with Dr. Barrera\par \par #  Hypertension.   Found to have normal renin, elevated aldosterone raising possibility of\par mineralocorticoid hypertension or possibly KAN.   Thorough evaluation includeing CT and MRI of\par abdomen revealed normal appearing adrenal glands.   BP has come under control with current Rx.  \par \par #  Prediabetes -  He will stop by [facility] for updated fasting labs within the next few days and I took the\par     liberty of asking him to return here in several weeks.   Thank you very much.  -vianca \par \par \par

## 2019-11-30 NOTE — PHYSICAL EXAM
[Alert] : alert [No Acute Distress] : no acute distress [Well Nourished] : well nourished [Healthy Appearance] : healthy appearance [Normal Voice/Communication] : normal voice communication [No Proptosis] : no proptosis [Normal Rate and Effort] : normal respiratory rhythm and effort [No Accessory Muscle Use] : no accessory muscle use [No Respiratory Distress] : no respiratory distress [Normal Rate] : heart rate was normal  [No Stigmata of Cushings Syndrome] : no stigmata of cushings syndrome [No Involuntary Movements] : no involuntary movements were seen [No Tremors] : no tremors [Oriented x3] : oriented to person, place, and time [Normal Insight/Judgement] : insight and judgment were intact [Normal Affect] : the affect was normal [Normal Mood] : the mood was normal

## 2019-11-30 NOTE — ASSESSMENT
[FreeTextEntry1] : ~\par Multiple issues conspiring to contribute to fatigue. \par Although there may be a component of mineralocorticoid hypertension, no focal abnormalities of adrenal\par during structural studies and he is responding to current medical Rx.\par Two testosterone levels, LH, prolactin in normal range. \par \par It is worthwhile to attend to the prediabetes once other issues are not of concern.\par I have no objection to a trial of testosterone.   He is aware of risks, benefits, alternatives.

## 2019-12-03 RX ORDER — METHYLDOPA 500 MG/1
500 TABLET, FILM COATED ORAL
Qty: 180 | Refills: 0 | Status: DISCONTINUED | COMMUNITY
End: 2019-12-03

## 2019-12-05 ENCOUNTER — APPOINTMENT (OUTPATIENT)
Dept: INTERNAL MEDICINE | Facility: CLINIC | Age: 64
End: 2019-12-05
Payer: COMMERCIAL

## 2019-12-05 VITALS — SYSTOLIC BLOOD PRESSURE: 154 MMHG | HEART RATE: 68 BPM | DIASTOLIC BLOOD PRESSURE: 70 MMHG

## 2019-12-05 PROCEDURE — 93784 AMBL BP MNTR W/SOFTWARE: CPT

## 2019-12-05 RX ORDER — CARVEDILOL 12.5 MG/1
12.5 TABLET, FILM COATED ORAL TWICE DAILY
Qty: 180 | Refills: 1 | Status: DISCONTINUED | COMMUNITY
Start: 2019-11-05 | End: 2019-12-05

## 2019-12-06 ENCOUNTER — APPOINTMENT (OUTPATIENT)
Dept: ENDOCRINOLOGY | Facility: CLINIC | Age: 64
End: 2019-12-06
Payer: COMMERCIAL

## 2019-12-06 VITALS
OXYGEN SATURATION: 97 % | HEART RATE: 64 BPM | DIASTOLIC BLOOD PRESSURE: 80 MMHG | WEIGHT: 245 LBS | SYSTOLIC BLOOD PRESSURE: 160 MMHG | BODY MASS INDEX: 35.07 KG/M2 | HEIGHT: 70 IN

## 2019-12-06 PROCEDURE — 99213 OFFICE O/P EST LOW 20 MIN: CPT

## 2019-12-06 NOTE — HISTORY OF PRESENT ILLNESS
[FreeTextEntry1] : 64 yr old male referred for second opinion by nephrology for difficult to control high blood pressure. \par He has been diagnosed with HTN since his 40's was not very complaint with the treatment earlier on. \par Has had episodic rise in BP which has lead to hospital admission earlier this year. \par importantly his mother also suffered from uncontrolled HTN and had spikes in blood pressure. \par currently he is on methyl dopa 500 tid, nifedipine xr 30 daily, eplerenone 50 mg- 2 pills daily, labetalol 200 mg bid \par He has also been contemplating , androgen replacement hoping it might improve BP control -did not start it yet.\par has achieved some control as regards to sleep apnea \par has had significant weight gain over last few months -approx 20 pounds. \par no liquorice ingestion \par no other symptoms of cushing syndrome \par work up done by Dr Hellerman reviewed. \par \par

## 2019-12-10 ENCOUNTER — APPOINTMENT (OUTPATIENT)
Dept: ENDOCRINOLOGY | Facility: CLINIC | Age: 64
End: 2019-12-10

## 2019-12-22 LAB — 11DC SERPL-MCNC: 38 NG/DL

## 2020-01-10 ENCOUNTER — APPOINTMENT (OUTPATIENT)
Dept: ENDOCRINOLOGY | Facility: CLINIC | Age: 65
End: 2020-01-10
Payer: COMMERCIAL

## 2020-01-10 VITALS
BODY MASS INDEX: 35.5 KG/M2 | HEIGHT: 70 IN | DIASTOLIC BLOOD PRESSURE: 70 MMHG | HEART RATE: 68 BPM | WEIGHT: 248 LBS | SYSTOLIC BLOOD PRESSURE: 142 MMHG

## 2020-01-10 PROCEDURE — 99213 OFFICE O/P EST LOW 20 MIN: CPT

## 2020-01-10 RX ORDER — EPLERENONE 50 MG/1
50 TABLET, COATED ORAL DAILY
Qty: 180 | Refills: 0 | Status: DISCONTINUED | COMMUNITY
End: 2020-01-10

## 2020-01-10 RX ORDER — TESTOSTERONE 25 MG/2.5G
25 MG/2.5GM GEL TRANSDERMAL DAILY
Qty: 1 | Refills: 0 | Status: DISCONTINUED | COMMUNITY
Start: 2019-11-15 | End: 2020-01-10

## 2020-01-10 NOTE — HISTORY OF PRESENT ILLNESS
[FreeTextEntry1] : 64 yr old male referred for second opinion by nephrology for difficult to control high blood pressure. \par He has been diagnosed with HTN since his 40's was not very complaint with the treatment earlier on. \par Has had episodic rise in BP which has lead to hospital admission earlier this year. \par importantly his mother also suffered from uncontrolled HTN and had spikes in blood pressure. \par currently he is on methyl dopa 500 tid, nifedipine xr 30 daily, eplerenone 50 mg- 2 pills daily, labetalol 200 mg bid \par He has also been contemplating , androgen replacement hoping it might improve BP control -did not start it yet.\par has achieved some control as regards to sleep apnea \par has had significant weight gain over last few months -approx 20 pounds. \par no liquorice ingestion \par no other symptoms of cushing syndrome \par work up done by Dr Hellerman reviewed. 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 \par Review of system \par no chest pain, no palpitations \par no Shortness of breath,no wheezing. \par . \par \par \par

## 2020-01-10 NOTE — REASON FOR VISIT
[Follow-Up: _____] : a [unfilled] follow-up visit [FreeTextEntry1] : HTN  [Consultation] : a consultation visit

## 2020-01-31 ENCOUNTER — APPOINTMENT (OUTPATIENT)
Dept: ENDOCRINOLOGY | Facility: CLINIC | Age: 65
End: 2020-01-31
Payer: COMMERCIAL

## 2020-01-31 VITALS
HEART RATE: 69 BPM | DIASTOLIC BLOOD PRESSURE: 80 MMHG | WEIGHT: 248 LBS | SYSTOLIC BLOOD PRESSURE: 142 MMHG | OXYGEN SATURATION: 95 % | HEIGHT: 70 IN | BODY MASS INDEX: 35.5 KG/M2

## 2020-01-31 LAB
ALBUMIN SERPL ELPH-MCNC: 4.5 G/DL
ALDOSTERONE SERUM: 30.8 NG/DL
ALP BLD-CCNC: 77 U/L
ALT SERPL-CCNC: 18 U/L
ANION GAP SERPL CALC-SCNC: 14 MMOL/L
AST SERPL-CCNC: 17 U/L
BILIRUB SERPL-MCNC: 0.2 MG/DL
BUN SERPL-MCNC: 13 MG/DL
CALCIUM SERPL-MCNC: 9.9 MG/DL
CHLORIDE SERPL-SCNC: 104 MMOL/L
CO2 SERPL-SCNC: 25 MMOL/L
CORTIS 24H UR-MCNC: 24 H
CORTIS 24H UR-MRATE: 19 MCG/24 H
CORTIS SAL-MCNC: NORMAL
CREAT SERPL-MCNC: 1.23 MG/DL
DOPAMINE UR-MCNC: 279 UG/L
DOPAMINE, UR, 24HR: 642 UG/24 HR
EPINEPH UR-MCNC: 4 UG/L
EPINEPHRINE, U, 24HR: 9 UG/24 HR
GLUCOSE SERPL-MCNC: 105 MG/DL
METANEPHRINE, PL: <10 PG/ML
NOREPINEPH UR-MCNC: 14 UG/L
NOREPINEPHRINE,U,24H: 32 UG/24 HR
NORMETANEPHRINE, PL: 31 PG/ML
POTASSIUM SERPL-SCNC: 3.9 MMOL/L
PROT SERPL-MCNC: 6.7 G/DL
SODIUM SERPL-SCNC: 143 MMOL/L
SPECIMEN VOL 24H UR: 2300 ML

## 2020-01-31 PROCEDURE — 99214 OFFICE O/P EST MOD 30 MIN: CPT

## 2020-01-31 NOTE — ASSESSMENT
[Long Term Vascular Complications] : long term vascular complications of diabetes [FreeTextEntry2] : low salt diet

## 2020-01-31 NOTE — REASON FOR VISIT
[Consultation] : a consultation visit [Follow-Up: _____] : a [unfilled] follow-up visit [FreeTextEntry1] : HTN

## 2020-01-31 NOTE — HISTORY OF PRESENT ILLNESS
[FreeTextEntry1] : 64 yr old male referred for second opinion by nephrology for difficult to control high blood pressure. \par He has been diagnosed with HTN since his 40's was not very complaint with the treatment earlier on. \par Has had episodic rise in BP which has lead to hospital admission earlier this year. \par importantly his mother also suffered from uncontrolled HTN and had spikes in blood pressure. \par currently he is on methyl dopa 500 tid, nifedipine xr 30 daily, eplerenone 50 mg- 2 pills daily, labetalol 200 mg bid \par He has also been contemplating , androgen replacement hoping it might improve BP control -did not start it yet.\par has achieved some control as regards to sleep apnea \par has had significant weight gain over last few months -approx 20 pounds. \par no liquorice ingestion \par no other symptoms of cushing syndrome \par work up done by Dr Hellerman reviewed. 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 \par Review of system \par no chest pain, no palpitations \par no Shortness of breath,no wheezing. \par . \par \par 01/31/2020- FOLLOW UP\par here to discuss  test results \par BP control is still variable \par not restarted eplerenone\par no edema \par discussed results \par no evidence of pheochromocytoma or Cushing syndrome \par elevated aldosterone, but PRA not done by lab \par 24 hr urine bartolo is pending \par trying to follow low salt diet. \par Review of system \par no chest pain, no palpitations \par no Shortness of breath,no wheezing. \par \par   \par

## 2020-02-07 LAB
ALDOST 24H UR-MCNC: NORMAL
ALDOSTERONE SERUM: 27.7 NG/DL
ANION GAP SERPL CALC-SCNC: 15 MMOL/L
BUN SERPL-MCNC: 11 MG/DL
CALCIUM SERPL-MCNC: 9.7 MG/DL
CHLORIDE SERPL-SCNC: 103 MMOL/L
CO2 SERPL-SCNC: 25 MMOL/L
CREAT SERPL-MCNC: 1.06 MG/DL
GLUCOSE SERPL-MCNC: 106 MG/DL
POTASSIUM SERPL-SCNC: 3.9 MMOL/L
RENIN PLASMA: <2.1 PG/ML
SODIUM SERPL-SCNC: 142 MMOL/L

## 2020-02-11 ENCOUNTER — LABORATORY RESULT (OUTPATIENT)
Age: 65
End: 2020-02-11

## 2020-02-11 LAB
18-HYDROXYCORTICOSTERONE [MASS/VOLUME] IN SERUM OR PLASMA: 14 NG/DL
RENIN ACTIVITY, PLASMA: <0.167 NG/ML/HR

## 2020-02-24 LAB
ALDOSTERONE SERUM: 34.4 NG/DL
FSH SERPL-MCNC: 4.1 IU/L
IGF-1 INTERP: NORMAL
IGF-I BLD-MCNC: 152 NG/ML
LH SERPL-ACNC: 3.1 IU/L
PROLACTIN SERPL-MCNC: 5.9 NG/ML
RENIN ACTIVITY, PLASMA: <0.167 NG/ML/HR
TESTOST BND SERPL-MCNC: 7.3 PG/ML
TESTOST SERPL-MCNC: 314.5 NG/DL

## 2020-04-24 ENCOUNTER — RX RENEWAL (OUTPATIENT)
Age: 65
End: 2020-04-24

## 2021-01-05 ENCOUNTER — RX RENEWAL (OUTPATIENT)
Age: 66
End: 2021-01-05

## 2021-01-14 ENCOUNTER — RESULT REVIEW (OUTPATIENT)
Age: 66
End: 2021-01-14

## 2021-01-14 ENCOUNTER — APPOINTMENT (OUTPATIENT)
Dept: NEPHROLOGY | Facility: CLINIC | Age: 66
End: 2021-01-14
Payer: MEDICARE

## 2021-01-14 VITALS — SYSTOLIC BLOOD PRESSURE: 124 MMHG | DIASTOLIC BLOOD PRESSURE: 56 MMHG | HEART RATE: 70 BPM

## 2021-01-14 DIAGNOSIS — E78.5 HYPERLIPIDEMIA, UNSPECIFIED: ICD-10-CM

## 2021-01-14 PROCEDURE — 36415 COLL VENOUS BLD VENIPUNCTURE: CPT

## 2021-01-14 RX ORDER — ASPIRIN 81 MG
81 TABLET, DELAYED RELEASE (ENTERIC COATED) ORAL DAILY
Refills: 0 | Status: ACTIVE | COMMUNITY

## 2021-01-28 ENCOUNTER — NON-APPOINTMENT (OUTPATIENT)
Age: 66
End: 2021-01-28

## 2021-01-28 ENCOUNTER — APPOINTMENT (OUTPATIENT)
Dept: ENDOCRINOLOGY | Facility: CLINIC | Age: 66
End: 2021-01-28
Payer: MEDICARE

## 2021-01-28 VITALS
OXYGEN SATURATION: 97 % | HEIGHT: 70 IN | DIASTOLIC BLOOD PRESSURE: 98 MMHG | WEIGHT: 244 LBS | SYSTOLIC BLOOD PRESSURE: 180 MMHG | BODY MASS INDEX: 34.93 KG/M2 | HEART RATE: 72 BPM

## 2021-01-28 DIAGNOSIS — Z82.49 FAMILY HISTORY OF ISCHEMIC HEART DISEASE AND OTHER DISEASES OF THE CIRCULATORY SYSTEM: ICD-10-CM

## 2021-01-28 DIAGNOSIS — Z87.891 PERSONAL HISTORY OF NICOTINE DEPENDENCE: ICD-10-CM

## 2021-01-28 PROCEDURE — G0447 BEHAVIOR COUNSEL OBESITY 15M: CPT | Mod: 59

## 2021-01-28 PROCEDURE — 99205 OFFICE O/P NEW HI 60 MIN: CPT | Mod: 25

## 2021-01-28 NOTE — HISTORY OF PRESENT ILLNESS
[FreeTextEntry1] : 65-year-old man self-referred for uncontrolled hypertension. 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

## 2021-02-02 ENCOUNTER — RESULT REVIEW (OUTPATIENT)
Age: 66
End: 2021-02-02

## 2021-02-05 ENCOUNTER — APPOINTMENT (OUTPATIENT)
Dept: NEPHROLOGY | Facility: CLINIC | Age: 66
End: 2021-02-05
Payer: MEDICARE

## 2021-02-05 ENCOUNTER — RESULT REVIEW (OUTPATIENT)
Age: 66
End: 2021-02-05

## 2021-02-05 VITALS
HEART RATE: 62 BPM | BODY MASS INDEX: 34.22 KG/M2 | HEIGHT: 70 IN | SYSTOLIC BLOOD PRESSURE: 156 MMHG | WEIGHT: 239 LBS | DIASTOLIC BLOOD PRESSURE: 80 MMHG | TEMPERATURE: 97.7 F | OXYGEN SATURATION: 98 %

## 2021-02-05 PROCEDURE — 99213 OFFICE O/P EST LOW 20 MIN: CPT

## 2021-02-12 NOTE — HISTORY OF PRESENT ILLNESS
[FreeTextEntry1] : 66 yo [place] male here for f/u for hypertensive urgency, hyperaldo, obesity, JAKUB - labs highly suggestive of  hyperaldo, BP elevated but improving; using CPAP, counseled on weightloss, exercise, avoidance of NSAIDs, is continuing to have diff losing weight - tried to taper down dose of methyldopa and replace with nifedipine while continuing labetalol and eplerenone, states BP elevated early am prior to taking meds and late pm before taking meds - suspect 2/2 waning effect - last visit 11/14/19 I d/c'd nifedipine and resumed methyldopa t 500mg bid, changed labetalol to carvedilol and he remained on eplerenone, but latter was stopped by Endo in order to obtain a more accurate renin bartolo level, and he did not resume it\par \par CTA  showed no AAA or KAN; MRI of adrenals -ve for adenoma, \par \par

## 2021-02-12 NOTE — ASSESSMENT
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

## 2021-02-12 NOTE — PHYSICAL EXAM
[General Appearance - Alert] : alert [General Appearance - In No Acute Distress] : in no acute distress [Sclera] : the sclera and conjunctiva were normal [Extraocular Movements] : extraocular movements were intact [Outer Ear] : the ears and nose were normal in appearance [Hearing Threshold Finger Rub Not [place]] : hearing was normal [Neck Appearance] : the appearance of the neck was normal [Exaggerated Use Of Accessory Muscles For Inspiration] : no accessory muscle use [Apical Impulse] : the apical impulse was normal [Heart Sounds] : normal S1 and S2 [No CVA Tenderness] : no ~M costovertebral angle tenderness [Abnormal Walk] : normal gait [Musculoskeletal - Swelling] : no joint swelling seen [Skin Color & Pigmentation] : normal skin color and pigmentation [Skin Turgor] : normal skin turgor [] : no rash [Oriented To Time, Place, And Person] : oriented to person, place, and time [Impaired Insight] : insight and judgment were intact [Edema] : there was no peripheral edema [Veins - Varicosity Changes] : there were no varicosital changes [Bowel Sounds] : normal bowel sounds [Abdomen Soft] : soft [Cranial Nerves] : cranial nerves 2-12 were intact [No Focal Deficits] : no focal deficits

## 2021-02-17 ENCOUNTER — RESULT REVIEW (OUTPATIENT)
Age: 66
End: 2021-02-17

## 2021-03-02 ENCOUNTER — TRANSCRIPTION ENCOUNTER (OUTPATIENT)
Age: 66
End: 2021-03-02

## 2021-03-02 ENCOUNTER — APPOINTMENT (OUTPATIENT)
Dept: GASTROENTEROLOGY | Facility: CLINIC | Age: 66
End: 2021-03-02
Payer: MEDICARE

## 2021-03-02 VITALS
DIASTOLIC BLOOD PRESSURE: 88 MMHG | TEMPERATURE: 98.3 F | HEART RATE: 70 BPM | WEIGHT: 239 LBS | HEIGHT: 70 IN | SYSTOLIC BLOOD PRESSURE: 160 MMHG | BODY MASS INDEX: 34.22 KG/M2

## 2021-03-02 DIAGNOSIS — Z12.11 ENCOUNTER FOR SCREENING FOR MALIGNANT NEOPLASM OF COLON: ICD-10-CM

## 2021-03-02 PROCEDURE — 99204 OFFICE O/P NEW MOD 45 MIN: CPT

## 2021-03-02 NOTE — HISTORY OF PRESENT ILLNESS
[FreeTextEntry1] : Mr. Nelson is a pleasant 65M h/o HTN, hyperaldosteronism, obesity, JAKUB, anxiety, fatty liver who is referred by Dr. Elizabeth for evaluation of a pancreatic cyst.\par \par He had an MRI 10/19 showing a small pancreatic cyst measured as 3 mm.\par \par He currently feels well, no abdominal pain, heartburn, regurgitation, N/V, diarrhea, constipation, black stool, rectal bleeding.\par \par He had a colonoscopy 5 years ago, had 1 polyp removed, was told to have a repeat colonoscopy in 5 years.\par \par Does not smoke or drink.\par \par No FHx of any GI malignancies.\par \par Prior imaging:\par CT A/P 2/21:\par -IMPRESSION:\par No suspicious mass or adenopathy within the abdomen/pelvis. Normal appearance of the kidneys and adrenal glands. No significant interval change.\par \par MRI abd w/wo contrast 10/19:\par IMPRESSION:  \par -No adrenal thickening or discrete nodule.\par -Diffuse hepatic steatosis.\par -3 mm cystic pancreatic lesion. Recommend follow-up MRI/MRCP with and without contrast in two years.\par -Enhancement at the gallbladder fundus may represent localized adenomyomatosis versus small gallbladder polyp. Recommend correlation with right upper quadrant ultrasound.\par \par CT A/P 7/19:\par IMPRESSION: \par -Vascular structures are essentially intact with atherosclerotic irregularity and calcifications but no evidence of stenosis including attention to the renal arteries. Normal appearance of the kidneys and adrenal glands.\par -Hepatic steatosis.\par \par U/S abd 10/10:\par IMPRESSION: \par -Echodense hepatic parenchyma. Findings consistent with infiltrative process, most likely fatty infiltration.

## 2021-03-05 ENCOUNTER — RESULT REVIEW (OUTPATIENT)
Age: 66
End: 2021-03-05

## 2021-03-05 ENCOUNTER — APPOINTMENT (OUTPATIENT)
Dept: NEPHROLOGY | Facility: CLINIC | Age: 66
End: 2021-03-05
Payer: MEDICARE

## 2021-03-05 VITALS
HEART RATE: 66 BPM | DIASTOLIC BLOOD PRESSURE: 68 MMHG | TEMPERATURE: 96.2 F | BODY MASS INDEX: 34.72 KG/M2 | SYSTOLIC BLOOD PRESSURE: 132 MMHG | WEIGHT: 242 LBS

## 2021-03-05 PROCEDURE — 36415 COLL VENOUS BLD VENIPUNCTURE: CPT

## 2021-03-05 PROCEDURE — 99214 OFFICE O/P EST MOD 30 MIN: CPT | Mod: 25

## 2021-03-05 NOTE — PHYSICAL EXAM
[General Appearance - Alert] : alert [General Appearance - In No Acute Distress] : in no acute distress [Sclera] : the sclera and conjunctiva were normal [Extraocular Movements] : extraocular movements were intact [Outer Ear] : the ears and nose were normal in appearance [Hearing Threshold Finger Rub Not [place]] : hearing was normal [Neck Appearance] : the appearance of the neck was normal [Exaggerated Use Of Accessory Muscles For Inspiration] : no accessory muscle use [Apical Impulse] : the apical impulse was normal [Heart Sounds] : normal S1 and S2 [Edema] : there was no peripheral edema [Veins - Varicosity Changes] : there were no varicosital changes [Bowel Sounds] : normal bowel sounds [Abdomen Soft] : soft [No CVA Tenderness] : no ~M costovertebral angle tenderness [Abnormal Walk] : normal gait [Musculoskeletal - Swelling] : no joint swelling seen [Skin Color & Pigmentation] : normal skin color and pigmentation [Skin Turgor] : normal skin turgor [] : no rash [Cranial Nerves] : cranial nerves 2-12 were intact [No Focal Deficits] : no focal deficits [Oriented To Time, Place, And Person] : oriented to person, place, and time [Impaired Insight] : insight and judgment were intact

## 2021-03-05 NOTE — HISTORY OF PRESENT ILLNESS
[FreeTextEntry1] : 66 yo [place] male here for f/u for hypertensive urgency, hyperaldo, obesity, JAKUB\par - pts BP markedly improved since resuming eplerenone, \par - had been on ot previously, but only brefly following hospitalization 9/2019; was stopped soon after for aldosterone testing ,b ut had not resumed it, BP has been elevted until resuming it last a few weeks ago\par - feels a little tired\par - CT scan reviewed, no adrenal mass or hyperplasia  described, benign cyst\par - has strong FHx of uncontrolled HTN\par \par  - labs repeated and are highly suggestive of  hyperaldo, BP elevated but improving; using CPAP, counseled on weightloss, exercise, avoidance of NSAIDs, is continuing to have diff losing weight \par \par CTA  showed no AAA or KAN; MRI of adrenals -ve for adenoma, \par \par

## 2021-03-05 NOTE — ASSESSMENT
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 cont eplerenone, may need to increase dose\par HTN\par Obesity - counseled on weightloss excercize\par JAKUB - using CPAP, compliant\par \par - BP now at goal, start tapering off meds, starting with methyldopa, pt will record and report back readings, return for BP check in 1-2 weeks and to have genetic testing done\par - if need be will increase eplerenone ( onlyon 25mg daily), will hopefully be able to reduce/ween off other anti HTN meds\par - check BMET\par \par Dr. Starks note reviewed\par Dr. Pereira note reviewed\par Dr. Sanders note reviewed

## 2021-03-08 ENCOUNTER — NON-APPOINTMENT (OUTPATIENT)
Age: 66
End: 2021-03-08

## 2021-03-15 RX ORDER — METHYLDOPA 500 MG/1
500 TABLET, FILM COATED ORAL 3 TIMES DAILY
Qty: 270 | Refills: 3 | Status: DISCONTINUED | COMMUNITY
Start: 2020-04-24 | End: 2021-03-15

## 2021-03-22 ENCOUNTER — NON-APPOINTMENT (OUTPATIENT)
Age: 66
End: 2021-03-22

## 2021-03-24 ENCOUNTER — APPOINTMENT (OUTPATIENT)
Dept: NEPHROLOGY | Facility: CLINIC | Age: 66
End: 2021-03-24
Payer: MEDICARE

## 2021-03-24 VITALS
OXYGEN SATURATION: 99 % | WEIGHT: 239 LBS | BODY MASS INDEX: 34.22 KG/M2 | TEMPERATURE: 97.1 F | HEIGHT: 70 IN | HEART RATE: 67 BPM | SYSTOLIC BLOOD PRESSURE: 148 MMHG | DIASTOLIC BLOOD PRESSURE: 80 MMHG

## 2021-03-24 DIAGNOSIS — K86.2 CYST OF PANCREAS: ICD-10-CM

## 2021-03-24 DIAGNOSIS — E66.01 MORBID (SEVERE) OBESITY DUE TO EXCESS CALORIES: ICD-10-CM

## 2021-03-24 PROCEDURE — 99213 OFFICE O/P EST LOW 20 MIN: CPT

## 2021-03-24 RX ORDER — GUANFACINE 1 MG/1
1 TABLET, EXTENDED RELEASE ORAL DAILY
Qty: 90 | Refills: 0 | Status: DISCONTINUED | COMMUNITY
Start: 2021-03-15 | End: 2021-03-24

## 2021-03-24 NOTE — HISTORY OF PRESENT ILLNESS
[FreeTextEntry1] : 64 yo [place] male here for f/u for hypertensive urgency, hyperaldo, obesity, JAKUB\par - pts BP markedly improved since resuming eplerenone, \par - had been on ot previously, but only briefly following hospitalization 9/2019; was stopped soon after for aldosterone testing ,b ut had not resumed it, BP has been elevated until resuming it last a few weeks ago\par - feels a little tired\par - CT scan reviewed, no adrenal mass or hyperplasia  described, benign cyst\par - has strong FHx of uncontrolled HTN\par \par  - labs repeated and are highly suggestive of  hyperaldo, BP elevated but improving; using CPAP, counseled on weightloss, exercise, avoidance of NSAIDs, is continuing to have diff losing weight \par \par CTA  showed no AAA or KAN; MRI of adrenals -ve for adenoma, \par \par

## 2021-03-24 NOTE — HISTORY OF PRESENT ILLNESS
[FreeTextEntry1] : 66 yo [place] male here for f/u for hypertensive urgency, hyperaldo, obesity, JAKUB\par - pts BP markedly improved since resuming eplerenone, \par - had been on ot previously, but only briefly following hospitalization 9/2019; was stopped soon after for aldosterone testing ,b ut had not resumed it, BP has been elevated until resuming it last a few weeks ago\par - feels a little tired\par - CT scan reviewed, no adrenal mass or hyperplasia  described, benign cyst\par - has strong FHx of uncontrolled HTN\par \par  - labs repeated and are highly suggestive of  hyperaldo, BP elevated but improving; using CPAP, counseled on weightloss, exercise, avoidance of NSAIDs, is continuing to have diff losing weight \par \par CTA  showed no AAA or KAN; MRI of adrenals -ve for adenoma, \par \par

## 2021-03-24 NOTE — PHYSICAL EXAM
[General Appearance - Alert] : alert [General Appearance - In No Acute Distress] : in no acute distress [Sclera] : the sclera and conjunctiva were normal [Extraocular Movements] : extraocular movements were intact [Outer Ear] : the ears and nose were normal in appearance [Hearing Threshold Finger Rub Not [place]] : hearing was normal [Neck Appearance] : the appearance of the neck was normal [Exaggerated Use Of Accessory Muscles For Inspiration] : no accessory muscle use [Apical Impulse] : the apical impulse was normal [Heart Sounds] : normal S1 and S2 [Edema] : there was no peripheral edema [Veins - Varicosity Changes] : there were no varicosital changes [Bowel Sounds] : normal bowel sounds [Abdomen Soft] : soft [No CVA Tenderness] : no ~M costovertebral angle tenderness [Abnormal Walk] : normal gait [Musculoskeletal - Swelling] : no joint swelling seen [Skin Color & Pigmentation] : normal skin color and pigmentation [Skin Turgor] : normal skin turgor [] : no rash [Cranial Nerves] : cranial nerves 2-12 were intact [No Focal Deficits] : no focal deficits [Oriented To Time, Place, And Person] : oriented to person, place, and time [Impaired Insight] : insight and judgment were intact

## 2021-03-24 NOTE — ASSESSMENT
[FreeTextEntry1] : Hyperaldo 2/2 adrenal hyperplasia ( though not noted on CT or MRI), possibly genetic, imaging does not show adenoma \par \par HTN \par \par Obesity - counseled on weight loss exercise\par \par JAKUB - using CPAP, compliant\par \par - BP was at goal, but Methyldopa is no longer beig manufactired, so tried swithcing to Guanfacin - was not effective, so tried clonidine - pt noted BP as high as 225/120 and clonidine was making him very sleepy - HR 70's - \par - advised im to increase dose of labelol to 400mg bid and monitor BP, if > 180 SBP then take clonidine - provided HR stable will titrate dose of labatelol ( max 2400mg/day, currently on 400mg - will increase to 800mg\par - will obtain genetic testing, cont eplerenone,d/c clonidine,( use as emergency prn if increased dose of labatelol not sufficient\par - pt isntructedto call in am and return next week for RN check of BP\par \par Dr. Starks note reviewed\par Dr. Pereira note reviewed\par Dr. Sanders note reviewed

## 2021-03-24 NOTE — PHYSICAL EXAM
[General Appearance - Alert] : alert [General Appearance - In No Acute Distress] : in no acute distress [Sclera] : the sclera and conjunctiva were normal [Extraocular Movements] : extraocular movements were intact [Outer Ear] : the ears and nose were normal in appearance [Hearing Threshold Finger Rub Not [place]] : hearing was normal [Neck Appearance] : the appearance of the neck was normal [Exaggerated Use Of Accessory Muscles For Inspiration] : no accessory muscle use [Apical Impulse] : the apical impulse was normal [Heart Sounds] : normal S1 and S2 [Edema] : there was no peripheral edema [Veins - Varicosity Changes] : there were no varicosital changes [Bowel Sounds] : normal bowel sounds [Abdomen Soft] : soft [No CVA Tenderness] : no ~M costovertebral angle tenderness [Abnormal Walk] : normal gait [Musculoskeletal - Swelling] : no joint swelling seen [Skin Color & Pigmentation] : normal skin color and pigmentation [Skin Turgor] : normal skin turgor [] : no rash [Cranial Nerves] : cranial nerves 2-12 were intact [No Focal Deficits] : no focal deficits [Oriented To Time, Place, And Person] : oriented to person, place, and time [Impaired Insight] : insight and judgment were intact

## 2021-03-31 ENCOUNTER — APPOINTMENT (OUTPATIENT)
Dept: NEPHROLOGY | Facility: CLINIC | Age: 66
End: 2021-03-31
Payer: MEDICARE

## 2021-03-31 ENCOUNTER — RESULT REVIEW (OUTPATIENT)
Age: 66
End: 2021-03-31

## 2021-03-31 VITALS
HEART RATE: 84 BPM | SYSTOLIC BLOOD PRESSURE: 188 MMHG | TEMPERATURE: 98.9 F | DIASTOLIC BLOOD PRESSURE: 90 MMHG | BODY MASS INDEX: 34.22 KG/M2 | WEIGHT: 239 LBS | OXYGEN SATURATION: 99 % | HEIGHT: 70 IN

## 2021-03-31 PROCEDURE — 99214 OFFICE O/P EST MOD 30 MIN: CPT | Mod: 25

## 2021-03-31 PROCEDURE — 36415 COLL VENOUS BLD VENIPUNCTURE: CPT

## 2021-03-31 RX ORDER — CLONIDINE HYDROCHLORIDE 0.1 MG/1
0.1 TABLET ORAL TWICE DAILY
Qty: 60 | Refills: 0 | Status: DISCONTINUED | COMMUNITY
Start: 2021-03-19 | End: 2021-03-31

## 2021-03-31 NOTE — HISTORY OF PRESENT ILLNESS
[FreeTextEntry1] : 65 yo [place] male here for f/u for hypertensive urgency, hyperaldo, obesity, JAKUB\par - pts BP markedly improved since resuming eplerenone, but has increased again since methylodpa was discontinued by , so tried alternative ( Guanfacin) with inadequate response, so tried clonidine, same - but c/o fatigue and rebound ( )\par - has strong FHx of uncontrolled HTN\par \par  - labs repeated and are highly suggestive of  hyperaldo, BP elevated but improving; using CPAP, counseled on weightloss, exercise, avoidance of NSAIDs, is continuing to have diff losing weight \par \par CTA  showed no AAA or KAN;  both CT and MRI of adrenals -ve for adenoma, \par \par

## 2021-03-31 NOTE — ASSESSMENT
[FreeTextEntry1] : Hyperaldo 2/2 adrenal hyperplasia ( though not noted on CT or MRI), possibly genetic, imaging does not show adenoma \par \par HTN \par - cont to have great difficulty since methyldopa discontinued by \par - start losartan 25mg\par - reduce eplerenone to 50mg ( from 75)\par - resume labetalol 200mg now, 200 tonight then 300mg bid\par - cont nifedipine 30mg\par - recheck BMET next week\par - order MIBG scan\par - check serum metanephrine\par \par Obesity - counseled on weight loss exercise\par \par JAKUB - using CPAP, compliant\par \par - BP was at goal, but Methyldopa is no longer beig manufactired,\par - will obtain genetic testing, cont eplerenone,d/c clonidine,( use as emergency prn if increased dose of labatelol not sufficient\par - pt isntructedto call in am and return next week for RN check of BP\par \par Dr. Starks note reviewed\par Dr. Pereira note reviewed\par Dr. Sanders note reviewed

## 2021-03-31 NOTE — PHYSICAL EXAM
[General Appearance - Alert] : alert [General Appearance - In No Acute Distress] : in no acute distress [Sclera] : the sclera and conjunctiva were normal [Extraocular Movements] : extraocular movements were intact [Outer Ear] : the ears and nose were normal in appearance [Hearing Threshold Finger Rub Not [place]] : hearing was normal [Neck Appearance] : the appearance of the neck was normal [Exaggerated Use Of Accessory Muscles For Inspiration] : no accessory muscle use [Apical Impulse] : the apical impulse was normal [Heart Sounds] : normal S1 and S2 [Edema] : there was no peripheral edema [Bowel Sounds] : normal bowel sounds [Veins - Varicosity Changes] : there were no varicosital changes [Abdomen Soft] : soft [No CVA Tenderness] : no ~M costovertebral angle tenderness [Abnormal Walk] : normal gait [Musculoskeletal - Swelling] : no joint swelling seen [Skin Color & Pigmentation] : normal skin color and pigmentation [Skin Turgor] : normal skin turgor [] : no rash [Cranial Nerves] : cranial nerves 2-12 were intact [No Focal Deficits] : no focal deficits [Oriented To Time, Place, And Person] : oriented to person, place, and time [Impaired Insight] : insight and judgment were intact

## 2021-04-06 ENCOUNTER — APPOINTMENT (OUTPATIENT)
Dept: GASTROENTEROLOGY | Facility: HOSPITAL | Age: 66
End: 2021-04-06

## 2021-05-10 ENCOUNTER — APPOINTMENT (OUTPATIENT)
Dept: NEPHROLOGY | Facility: CLINIC | Age: 66
End: 2021-05-10
Payer: MEDICARE

## 2021-05-10 ENCOUNTER — RESULT REVIEW (OUTPATIENT)
Age: 66
End: 2021-05-10

## 2021-05-10 VITALS — DIASTOLIC BLOOD PRESSURE: 90 MMHG | SYSTOLIC BLOOD PRESSURE: 196 MMHG | HEART RATE: 68 BPM

## 2021-05-10 VITALS — SYSTOLIC BLOOD PRESSURE: 210 MMHG | DIASTOLIC BLOOD PRESSURE: 90 MMHG

## 2021-05-10 DIAGNOSIS — N28.1 CYST OF KIDNEY, ACQUIRED: ICD-10-CM

## 2021-05-10 PROCEDURE — 36415 COLL VENOUS BLD VENIPUNCTURE: CPT

## 2021-05-12 ENCOUNTER — RESULT REVIEW (OUTPATIENT)
Age: 66
End: 2021-05-12

## 2021-05-12 ENCOUNTER — APPOINTMENT (OUTPATIENT)
Dept: NEPHROLOGY | Facility: CLINIC | Age: 66
End: 2021-05-12
Payer: MEDICARE

## 2021-05-12 VITALS
HEIGHT: 70 IN | HEART RATE: 71 BPM | DIASTOLIC BLOOD PRESSURE: 80 MMHG | SYSTOLIC BLOOD PRESSURE: 174 MMHG | TEMPERATURE: 98.4 F | OXYGEN SATURATION: 99 % | BODY MASS INDEX: 34.5 KG/M2 | WEIGHT: 241 LBS

## 2021-05-12 DIAGNOSIS — E26.9 HYPERALDOSTERONISM, UNSPECIFIED: ICD-10-CM

## 2021-05-12 DIAGNOSIS — I10 ESSENTIAL (PRIMARY) HYPERTENSION: ICD-10-CM

## 2021-05-12 PROCEDURE — 99214 OFFICE O/P EST MOD 30 MIN: CPT | Mod: 25

## 2021-05-12 PROCEDURE — 36415 COLL VENOUS BLD VENIPUNCTURE: CPT

## 2021-05-12 NOTE — PHYSICAL EXAM
[General Appearance - Alert] : alert [General Appearance - In No Acute Distress] : in no acute distress [Sclera] : the sclera and conjunctiva were normal [Extraocular Movements] : extraocular movements were intact [Outer Ear] : the ears and nose were normal in appearance [Hearing Threshold Finger Rub Not [place]] : hearing was normal [Neck Appearance] : the appearance of the neck was normal [Exaggerated Use Of Accessory Muscles For Inspiration] : no accessory muscle use [Apical Impulse] : the apical impulse was normal [Heart Sounds] : normal S1 and S2 [Edema] : there was no peripheral edema [Veins - Varicosity Changes] : there were no varicosital changes [Bowel Sounds] : normal bowel sounds [Abdomen Soft] : soft [No CVA Tenderness] : no ~M costovertebral angle tenderness [Abnormal Walk] : normal gait [Musculoskeletal - Swelling] : no joint swelling seen [Skin Color & Pigmentation] : normal skin color and pigmentation [Skin Turgor] : normal skin turgor [] : no rash [Cranial Nerves] : cranial nerves 2-12 were intact [No Focal Deficits] : no focal deficits [Oriented To Time, Place, And Person] : oriented to person, place, and time [Impaired Insight] : insight and judgment were intact

## 2021-05-12 NOTE — ASSESSMENT
[FreeTextEntry1] : Hyperaldo 2/2 adrenal hyperplasia ( though not noted on CT or MRI), possibly genetic, imaging does not show adenoma \par \par HTN \par - cont to have great difficulty since methyldopa discontinued by \par - resume losartan 25mg\par - change  eplerenone to spironolactone 100mg\par - increase labetalol 300mg tid\par - cont nifedipine 30mg\par - cont HCTZ\par - recheck BMET next week\par - order MIBG scan\par - recheck serum metanephrine\par - recheck cortisol, ACTH, deoxycort\par - order adrenal vein sampling\par \par Hypercalcemia\par - recheck\par \par Obesity - counseled on weight loss exercise\par \par AJKUB - using CPAP, compliant\par \par - BP was at goal, but Methyldopa is no longer beig manufactured,\par - genetic testing -ve for genetic d/o, \par - pt isntructedto call in am and return next week for RN check of BP\par \par Dr. Starks note reviewed\par Dr. Pereira note reviewed\par Dr. Sanders note reviewed

## 2021-05-12 NOTE — HISTORY OF PRESENT ILLNESS
[FreeTextEntry1] : 65 yo [place] male here for f/u for hypertensive urgency, hyperaldo, obesity, JAKUB\par - pts BP markedly improved since resuming eplerenone, but has increased again since methylodpa was discontinued by , so tried alternative ( Guanfacin) with inadequate response, so tried clonidine, same - but c/o fatigue and rebound ( )\par - has strong FHx of uncontrolled HTN\par \par  - labs repeated and are highly suggestive of  hyperaldo, BP elevated but improving; using CPAP, counseled on weightloss, exercise, avoidance of NSAIDs, is continuing to have diff losing weight \par \par CTA  showed no AAA or KAN;  both CT and MRI of adrenals -ve for adenoma, \par \par Now on multiple BP meds with inadequate response\par

## 2021-05-17 RX ORDER — SPIRONOLACTONE 100 MG/1
100 TABLET ORAL TWICE DAILY
Qty: 360 | Refills: 1 | Status: DISCONTINUED | COMMUNITY
Start: 2021-05-12 | End: 2021-05-17

## 2021-05-19 ENCOUNTER — APPOINTMENT (OUTPATIENT)
Dept: NEPHROLOGY | Facility: CLINIC | Age: 66
End: 2021-05-19

## 2021-06-03 ENCOUNTER — APPOINTMENT (OUTPATIENT)
Dept: ENDOCRINOLOGY | Facility: CLINIC | Age: 66
End: 2021-06-03

## 2021-06-04 RX ORDER — LOSARTAN POTASSIUM 25 MG/1
25 TABLET, FILM COATED ORAL DAILY
Qty: 1 | Refills: 1 | Status: DISCONTINUED | COMMUNITY
Start: 2021-03-31 | End: 2021-06-04

## 2021-06-07 RX ORDER — VALSARTAN 320 MG/1
320 TABLET, COATED ORAL DAILY
Refills: 0 | Status: ACTIVE | COMMUNITY

## 2021-09-01 ENCOUNTER — RX RENEWAL (OUTPATIENT)
Age: 66
End: 2021-09-01

## 2021-09-01 RX ORDER — HYDROCHLOROTHIAZIDE 25 MG/1
25 TABLET ORAL
Qty: 90 | Refills: 0 | Status: ACTIVE | COMMUNITY
Start: 2021-05-10 | End: 1900-01-01

## 2021-10-15 ENCOUNTER — RX RENEWAL (OUTPATIENT)
Age: 66
End: 2021-10-15

## 2021-10-15 RX ORDER — DOXAZOSIN 1 MG/1
1 TABLET ORAL DAILY
Refills: 0 | Status: ACTIVE | COMMUNITY

## 2021-10-15 RX ORDER — LABETALOL HYDROCHLORIDE 200 MG/1
200 TABLET, FILM COATED ORAL
Qty: 360 | Refills: 1 | Status: DISCONTINUED | COMMUNITY
Start: 2021-01-05 | End: 2021-10-15

## 2021-10-15 RX ORDER — BISOPROLOL FUMARATE 5 MG/1
5 TABLET, FILM COATED ORAL DAILY
Refills: 0 | Status: ACTIVE | COMMUNITY

## 2021-10-15 RX ORDER — EPLERENONE 25 MG/1
25 TABLET, COATED ORAL
Qty: 180 | Refills: 3 | Status: ACTIVE | COMMUNITY
Start: 2021-02-11 | End: 1900-01-01

## 2021-11-24 ENCOUNTER — RX RENEWAL (OUTPATIENT)
Age: 66
End: 2021-11-24

## 2021-11-24 RX ORDER — NIFEDIPINE 30 MG/1
30 TABLET, EXTENDED RELEASE ORAL DAILY
Qty: 90 | Refills: 1 | Status: ACTIVE | COMMUNITY
Start: 2021-11-24 | End: 1900-01-01

## 2022-05-09 NOTE — REASON FOR VISIT
PHYSICAL THERAPY - DAILY TREATMENT NOTE      Patient Name: Stephane Curran                         Date: 2022  : 1995                                    YES Patient  Verified  Visit #:                     Insurance: Payor: [facility] / Plan: Kenia Swartz PPO / Product Type: PPO /       In time: 3:04 PM Out time: 4:14 PM   Total Treatment Time: 70 min.            Medicare/[facility] Time Tracking (below)   Total Timed Codes (min):  NA 1:1 Treatment Time:  NA      TREATMENT AREA =  Pain in left leg [M79.605]     SUBJECTIVE     Pain Level (on 0 to 10 scale):  6 / 10   Medication Changes/New allergies or changes in medical history, any new surgeries or procedures?    NO    If yes, update Summary List   Subjective Functional Status/Changes:  []???  No changes reported      Pt reports that he saw orthopedist and told that they need to wait and see, give it time. Pt reports Left toes still weak for attempts at exercises.         OBJECTIVE  Modalities Rationale:     decrease edema, decrease inflammation, decrease pain, increase tissue extensibility and increase muscle contraction/control to improve patient's ability to transfer, stand, walk, community mobility, ADLs, work, household chores, sleep, and recreation/fitness witih less/no pain.                          min []? ?? Estim, type/location:                                                            []? ??  att     []? ??  unatt     []? ??  w/US     []? ??  w/ice    []? ??  w/heat     min []? ??  Mechanical Traction: type/lbs                    []? ??  pro   []? ??  sup   []? ??  int   []? ??  cont    []? ??  before manual    []? ??  after manual   0  min []? ??  Ultrasound, settings/location:  1.8 W/cm2 @ 1 MHz 100% to left distal posterior thigh/hamstrings in prone.      min []? ??  Iontophoresis w/ dexamethasone, location:                                                []? ??  take home patch       []? ??  in clinic    10 min [x]???  Ice     []? ??  Heat    location/position: CP to L posterior thigh post-Tx/prone.      min []? ??  Vasopneumatic Device, press/temp:       min []? ??  Other:     [x]? ?? Skin assessment post-treatment (if applicable):    [x]? ??  intact    []? ??  redness- no adverse reaction     []? ??redness  adverse reaction:       30 min Therapeutic Exercise:  [x]? ??  See flow sheet   Rationale:      increase ROM and increase strength to improve the patients ability to transfer, stand, walk, community mobility, ADLs, work, household chores, sleep, and recreation/fitness witih less/no pain.     10 min Therapeutic Activity: [x]???  See flow sheet   Rationale:      increase ROM, increase strength, improve coordination, improve balance and increase proprioception to improve the patients ability to transfer, stand, walk, community mobility, ADLs, work, household chores, sleep, and recreation/fitness witih less/no pain.      20 min Neuromuscular Re-ed: [x]???  See flow sheet   Rationale:      increase strength, improve coordination, improve balance and increase proprioception to improve the patients ability to transfer, stand, walk, community mobility, ADLs, work, household chores, sleep, and recreation/fitness witih less/no pain.      0 min Manual Therapy: Technique:      []??? S/DTM []???IASTM []???PROM []??? Passive Stretching   []? ??manual TPR    []? ??Jt manipulation:Gr I []? ?? II []???  III []??? IV[]? ?? V[]???  Treatment Area:     Rationale:      decrease pain, increase ROM, increase tissue extensibility, decrease trigger points and increase postural awareness to improve patient's ability to transfer, stand, walk, community mobility, ADLs, work, household chores, sleep, and recreation/fitness witih less/no pain.   The manual therapy interventions were performed at a separate and distinct time from the therapeutic activities interventions.     0 min Gait Training:  ___ feet with ___ device on level surfaces with ___ level of assistance   Rationale: To improve ambulation safety and efficiency in order to improve patient's ability to safely ambulate at home for self care.     0 min Self Care:    Rationale:    increase ROM, increase strength, improve coordination, improve balance and increase proprioception to improve the patients ability to transfer, stand, walk, community mobility, ADLs, work, household chores, sleep, and recreation/fitness witih less/no pain.     Billed With/As:   []??? TE   []??? TA   [x]? ?? Neuro   []??? Self Care Patient Education: [x]??? Review HEP    [x]? ?? Progressed/Changed HEP based on:   [x]? ?? positioning   [x]? ?? body mechanics   []? ?? transfers   []? ?? heat/ice application    [x]? ?? other:                 Other Objective/Functional Measures:     Pt did well with stool scoots and manual TM walk/push.      Post Treatment Pain Level (on 0 to 10) scale:   5 / 10      ASSESSMENT     Assessment/Changes in Function:       Foam beam walking and DynaDiscs were difficult for patient.      []? ?  See Progress Note/Recertification   Patient will continue to benefit from skilled PT services to modify and progress therapeutic interventions, address functional mobility deficits, address ROM deficits, address strength deficits, analyze and address soft tissue restrictions, analyze and cue movement patterns, analyze and modify body mechanics/ergonomics, assess and modify postural abnormalities and instruct in home and community integration to attain remaining goals.      Progress toward goals / Updated goals:     Good Progress to    [x]? ? STG    []? ? LTG  1, 3, and 4 as shown by patient reporting and by observed motions and performance of activities during the PT session.         PLAN               [x]? ??  Upgrade activities as tolerated YES Continue plan of care   []? ??  Discharge due to :     []???  Other:                  Therapist: Tabby Gamez     Date: 05/09/2022 Time: 12:55 PM [Follow-Up] : a follow-up visit [FreeTextEntry1] : f/u HTN